# Patient Record
Sex: FEMALE | Race: WHITE | Employment: FULL TIME | URBAN - METROPOLITAN AREA
[De-identification: names, ages, dates, MRNs, and addresses within clinical notes are randomized per-mention and may not be internally consistent; named-entity substitution may affect disease eponyms.]

---

## 2017-04-18 ENCOUNTER — OFFICE VISIT (OUTPATIENT)
Dept: OBGYN CLINIC | Facility: CLINIC | Age: 27
End: 2017-04-18

## 2017-04-18 VITALS
BODY MASS INDEX: 23.54 KG/M2 | DIASTOLIC BLOOD PRESSURE: 70 MMHG | WEIGHT: 150 LBS | HEIGHT: 67 IN | SYSTOLIC BLOOD PRESSURE: 108 MMHG

## 2017-04-18 DIAGNOSIS — Z01.419 ENCOUNTER FOR GYNECOLOGICAL EXAMINATION WITHOUT ABNORMAL FINDING: Primary | ICD-10-CM

## 2017-04-18 PROCEDURE — 99395 PREV VISIT EST AGE 18-39: CPT | Performed by: OBSTETRICS & GYNECOLOGY

## 2017-04-18 PROCEDURE — 87591 N.GONORRHOEAE DNA AMP PROB: CPT | Performed by: OBSTETRICS & GYNECOLOGY

## 2017-04-18 PROCEDURE — 88175 CYTOPATH C/V AUTO FLUID REDO: CPT | Performed by: OBSTETRICS & GYNECOLOGY

## 2017-04-18 PROCEDURE — 87491 CHLMYD TRACH DNA AMP PROBE: CPT | Performed by: OBSTETRICS & GYNECOLOGY

## 2017-04-18 RX ORDER — NORETHINDRONE AND ETHINYL ESTRADIOL 1 MG-35MCG
1 KIT ORAL DAILY
Qty: 3 PACKAGE | Refills: 3 | Status: SHIPPED | OUTPATIENT
Start: 2017-04-18 | End: 2017-04-30

## 2017-04-18 RX ORDER — NORETHINDRONE AND ETHINYL ESTRADIOL 1 MG-35MCG
KIT ORAL
Refills: 0 | COMMUNITY
Start: 2017-04-04 | End: 2017-04-18

## 2017-04-18 NOTE — PROGRESS NOTES
GYN H&P     2017  6:06 PM    CC:Patient presents for annual.    HPI: Patient is a 31year old  Patient's last menstrual period was 2017.  who presents for annual.    Menses: once a month, no heavy bleeding or pain  Pelvic pain: no  Vaginal nodes. No history of splenectomy. PSYCHIATRIC:  No history of depression or anxiety. ENDOCRINOLOGIC:  No reports of sweating, cold or heat intolerance. No polyuria or polydipsia. ALLERGIES:  No history of asthma, hives, eczema or rhinitis.     /70

## 2017-05-01 RX ORDER — NORETHINDRONE AND ETHINYL ESTRADIOL 1 MG-35MCG
KIT ORAL
Qty: 28 TABLET | Status: SHIPPED | OUTPATIENT
Start: 2017-05-01 | End: 2018-01-04

## 2018-04-11 ENCOUNTER — HOSPITAL (OUTPATIENT)
Dept: OTHER | Age: 28
End: 2018-04-11
Attending: FAMILY MEDICINE

## 2018-04-11 ENCOUNTER — IMAGING SERVICES (OUTPATIENT)
Dept: OTHER | Age: 28
End: 2018-04-11

## 2018-05-01 ENCOUNTER — OFFICE VISIT (OUTPATIENT)
Dept: OBGYN CLINIC | Facility: CLINIC | Age: 28
End: 2018-05-01

## 2018-05-01 VITALS
WEIGHT: 150 LBS | SYSTOLIC BLOOD PRESSURE: 100 MMHG | DIASTOLIC BLOOD PRESSURE: 76 MMHG | BODY MASS INDEX: 23.54 KG/M2 | HEIGHT: 67 IN

## 2018-05-01 DIAGNOSIS — R10.2 PELVIC PAIN: ICD-10-CM

## 2018-05-01 DIAGNOSIS — Z01.419 ENCOUNTER FOR GYNECOLOGICAL EXAMINATION WITHOUT ABNORMAL FINDING: Primary | ICD-10-CM

## 2018-05-01 DIAGNOSIS — Z30.41 ENCOUNTER FOR SURVEILLANCE OF CONTRACEPTIVE PILLS: ICD-10-CM

## 2018-05-01 PROBLEM — J98.4 CYSTIC-BULLOUS DISEASE OF LUNG: Status: ACTIVE | Noted: 2018-04-23

## 2018-05-01 PROCEDURE — 99395 PREV VISIT EST AGE 18-39: CPT | Performed by: OBSTETRICS & GYNECOLOGY

## 2018-05-01 RX ORDER — MULTIVITAMIN WITH IRON
250 TABLET ORAL
COMMUNITY
End: 2019-06-10

## 2018-05-01 RX ORDER — DOCUSATE SODIUM 100 MG/1
CAPSULE, LIQUID FILLED ORAL
Refills: 0 | COMMUNITY
Start: 2018-04-26 | End: 2020-06-23

## 2018-05-01 RX ORDER — HYDROCODONE BITARTRATE AND ACETAMINOPHEN 10; 325 MG/1; MG/1
TABLET ORAL
Refills: 0 | COMMUNITY
Start: 2018-04-26 | End: 2020-06-23

## 2018-05-01 NOTE — PROGRESS NOTES
GYN H&P     2018  5:54 PM    CC: Patient is here for annual.    HPI: Patient is a 28year old  for annual. Recently admitted for spontaneous pneumothorax on L lung. She is having some LA pain and feeling lower L abdominal pain.  This started whil 4/2018 x 2 on L side treated with VATS and clipping @ [facility]     Past Surgical History:  No date: COLPOSCOPY, CERVIX W/UPPER ADJACENT VAGINA; W/*  No date: ORAL SURGERY  04/23/2018: OTHER      Comment: lung surgery   04/2018: OTHER      Comment: VATS for palpable hernias  GYNE/:  External Genitalia: Normal appearing, no lesions, normal hair distribution   Urethral meatus appear wnl, no abnormal discharge or lesions noted.    Bladder: well supported, urethra wnl, no palpable tenderness or masses, no discha

## 2018-06-05 ENCOUNTER — PATIENT MESSAGE (OUTPATIENT)
Dept: OBGYN CLINIC | Facility: CLINIC | Age: 28
End: 2018-06-05

## 2018-06-05 RX ORDER — NORETHINDRONE AND ETHINYL ESTRADIOL 1 MG-35MCG
KIT ORAL
Qty: 84 TABLET | Refills: 1 | OUTPATIENT
Start: 2018-06-05

## 2018-06-05 NOTE — TELEPHONE ENCOUNTER
From: Agus Snyder  To: Zohra Kelsey MD  Sent: 6/5/2018 9:26 AM CDT  Subject: Prescription Question    Hi! I believe the birth control prescription was incorrectly sent to [facility] when the renewal was entered.  Because I had just refill

## 2018-06-05 NOTE — TELEPHONE ENCOUNTER
Refill request rec'd for OCP. Pt seen on 5/1/18 for annual and OCP refill sent in by Dr. Gable Apley.

## 2018-06-05 NOTE — TELEPHONE ENCOUNTER
Per pt, her OCP has to be sent to [facility] per her ins co. Resent script to [facility], otherwise, pt can have all other meds sent to [place].

## 2019-05-09 RX ORDER — NORETHINDRONE AND ETHINYL ESTRADIOL 1 MG-35MCG
KIT ORAL
Qty: 84 TABLET | Refills: 0 | Status: SHIPPED | OUTPATIENT
Start: 2019-05-09 | End: 2019-06-10

## 2019-06-10 ENCOUNTER — OFFICE VISIT (OUTPATIENT)
Dept: OBGYN CLINIC | Facility: CLINIC | Age: 29
End: 2019-06-10
Payer: COMMERCIAL

## 2019-06-10 VITALS
BODY MASS INDEX: 23.15 KG/M2 | HEIGHT: 67 IN | DIASTOLIC BLOOD PRESSURE: 60 MMHG | WEIGHT: 147.5 LBS | SYSTOLIC BLOOD PRESSURE: 118 MMHG

## 2019-06-10 DIAGNOSIS — Z30.41 ENCOUNTER FOR SURVEILLANCE OF CONTRACEPTIVE PILLS: ICD-10-CM

## 2019-06-10 DIAGNOSIS — Z01.419 ENCOUNTER FOR GYNECOLOGICAL EXAMINATION WITHOUT ABNORMAL FINDING: Primary | ICD-10-CM

## 2019-06-10 DIAGNOSIS — Z11.3 SCREENING EXAMINATION FOR VENEREAL DISEASE: ICD-10-CM

## 2019-06-10 PROCEDURE — 99395 PREV VISIT EST AGE 18-39: CPT | Performed by: OBSTETRICS & GYNECOLOGY

## 2019-06-10 PROCEDURE — 87591 N.GONORRHOEAE DNA AMP PROB: CPT | Performed by: OBSTETRICS & GYNECOLOGY

## 2019-06-10 PROCEDURE — 87491 CHLMYD TRACH DNA AMP PROBE: CPT | Performed by: OBSTETRICS & GYNECOLOGY

## 2019-06-10 RX ORDER — AMOXICILLIN 250 MG
1 CAPSULE ORAL
COMMUNITY
Start: 2019-05-22 | End: 2020-06-23

## 2019-06-10 NOTE — PROGRESS NOTES
GYN H&P     6/10/2019  5:03 PM    CC: Patient is here for annual. LPS 2017 WNL    HPI: Patient is a 33year old  for above. Patient has had sclerotherapy on both sides due to recurrent pneumothorax.  She would like STI screen          Patient's last file      Years of education: Not on file      Highest education level: Not on file    Occupational History      Occupation:         Comment: [employer]    Tobacco Use      Smoking status: Never Smoker      Smokeless tobacco: Never Used

## 2019-06-12 NOTE — PROGRESS NOTES
Message sent to pt's mychart informing her of negative test results. Pt to call or e-mail the office if she has any questions or concerns.

## 2019-08-13 ENCOUNTER — HOSPITAL (OUTPATIENT)
Dept: OTHER | Age: 29
End: 2019-08-13
Attending: FAMILY MEDICINE

## 2020-06-09 DIAGNOSIS — Z30.41 ENCOUNTER FOR SURVEILLANCE OF CONTRACEPTIVE PILLS: ICD-10-CM

## 2020-06-11 RX ORDER — NORETHINDRONE AND ETHINYL ESTRADIOL 1 MG-35MCG
KIT ORAL
Qty: 84 TABLET | Refills: 0 | Status: SHIPPED | OUTPATIENT
Start: 2020-06-11 | End: 2020-06-23

## 2020-06-23 ENCOUNTER — OFFICE VISIT (OUTPATIENT)
Dept: OBGYN CLINIC | Facility: CLINIC | Age: 30
End: 2020-06-23
Payer: COMMERCIAL

## 2020-06-23 VITALS
BODY MASS INDEX: 26.84 KG/M2 | DIASTOLIC BLOOD PRESSURE: 80 MMHG | SYSTOLIC BLOOD PRESSURE: 120 MMHG | WEIGHT: 171 LBS | HEIGHT: 67 IN

## 2020-06-23 DIAGNOSIS — Z30.41 ENCOUNTER FOR SURVEILLANCE OF CONTRACEPTIVE PILLS: ICD-10-CM

## 2020-06-23 DIAGNOSIS — Z01.419 ENCOUNTER FOR GYNECOLOGICAL EXAMINATION WITHOUT ABNORMAL FINDING: Primary | ICD-10-CM

## 2020-06-23 PROCEDURE — 87624 HPV HI-RISK TYP POOLED RSLT: CPT | Performed by: OBSTETRICS & GYNECOLOGY

## 2020-06-23 PROCEDURE — 99395 PREV VISIT EST AGE 18-39: CPT | Performed by: OBSTETRICS & GYNECOLOGY

## 2020-06-23 RX ORDER — FEXOFENADINE HCL 180 MG/1
180 TABLET ORAL DAILY
COMMUNITY

## 2020-06-23 NOTE — PROGRESS NOTES
GYN H&P     2020  4:54 PM    CC: Patient is here for annual.     HPI: Patient is a 26year old  for annul. No heavy bleeding or pain, No problem with ocp. Sexually active a little. Patient's last menstrual period was 2020.     OB H education level: Not on file    Occupational History      Occupation:         Comment: [employer]    Tobacco Use      Smoking status: Never Smoker      Smokeless tobacco: Never Used    Substance and Sexual Activity      Alcohol use:  Yes MD Giovana

## 2020-06-25 PROBLEM — R87.612 PAPANICOLAOU SMEAR OF CERVIX WITH LOW GRADE SQUAMOUS INTRAEPITHELIAL LESION (LGSIL): Status: ACTIVE | Noted: 2020-06-25

## 2020-06-26 NOTE — PROGRESS NOTES
Called pt and provided Dr. Guzman Gear instructions. Pt scheduled colpo for July 21 at 1600. Pt verbalized understanding.

## 2020-07-21 ENCOUNTER — OFFICE VISIT (OUTPATIENT)
Dept: OBGYN CLINIC | Facility: CLINIC | Age: 30
End: 2020-07-21
Payer: COMMERCIAL

## 2020-07-21 VITALS — DIASTOLIC BLOOD PRESSURE: 60 MMHG | HEIGHT: 67 IN | BODY MASS INDEX: 27 KG/M2 | SYSTOLIC BLOOD PRESSURE: 100 MMHG

## 2020-07-21 DIAGNOSIS — R87.612 PAPANICOLAOU SMEAR OF CERVIX WITH LOW GRADE SQUAMOUS INTRAEPITHELIAL LESION (LGSIL): Primary | ICD-10-CM

## 2020-07-21 LAB
CONTROL LINE PRESENT WITH A CLEAR BACKGROUND (YES/NO): YES YES/NO
PREGNANCY TEST, URINE: NEGATIVE

## 2020-07-21 PROCEDURE — 88305 TISSUE EXAM BY PATHOLOGIST: CPT | Performed by: OBSTETRICS & GYNECOLOGY

## 2020-07-21 PROCEDURE — 81025 URINE PREGNANCY TEST: CPT | Performed by: OBSTETRICS & GYNECOLOGY

## 2020-07-21 PROCEDURE — 3074F SYST BP LT 130 MM HG: CPT | Performed by: OBSTETRICS & GYNECOLOGY

## 2020-07-21 PROCEDURE — 3078F DIAST BP <80 MM HG: CPT | Performed by: OBSTETRICS & GYNECOLOGY

## 2020-07-21 PROCEDURE — 57454 BX/CURETT OF CERVIX W/SCOPE: CPT | Performed by: OBSTETRICS & GYNECOLOGY

## 2021-07-15 DIAGNOSIS — Z30.41 ENCOUNTER FOR SURVEILLANCE OF CONTRACEPTIVE PILLS: ICD-10-CM

## 2021-07-19 RX ORDER — NORETHINDRONE AND ETHINYL ESTRADIOL 1 MG-35MCG
1 KIT ORAL DAILY
Qty: 28 TABLET | Refills: 0 | Status: SHIPPED | OUTPATIENT
Start: 2021-07-19 | End: 2021-08-19

## 2021-08-19 ENCOUNTER — OFFICE VISIT (OUTPATIENT)
Dept: OBGYN CLINIC | Facility: CLINIC | Age: 31
End: 2021-08-19
Payer: COMMERCIAL

## 2021-08-19 VITALS
BODY MASS INDEX: 26.37 KG/M2 | HEIGHT: 67 IN | SYSTOLIC BLOOD PRESSURE: 130 MMHG | DIASTOLIC BLOOD PRESSURE: 92 MMHG | WEIGHT: 168 LBS

## 2021-08-19 DIAGNOSIS — R87.612 PAPANICOLAOU SMEAR OF CERVIX WITH LOW GRADE SQUAMOUS INTRAEPITHELIAL LESION (LGSIL): ICD-10-CM

## 2021-08-19 DIAGNOSIS — Z30.41 ENCOUNTER FOR SURVEILLANCE OF CONTRACEPTIVE PILLS: ICD-10-CM

## 2021-08-19 DIAGNOSIS — Z01.419 ENCOUNTER FOR GYNECOLOGICAL EXAMINATION WITHOUT ABNORMAL FINDING: Primary | ICD-10-CM

## 2021-08-19 PROCEDURE — 3008F BODY MASS INDEX DOCD: CPT | Performed by: OBSTETRICS & GYNECOLOGY

## 2021-08-19 PROCEDURE — 3075F SYST BP GE 130 - 139MM HG: CPT | Performed by: OBSTETRICS & GYNECOLOGY

## 2021-08-19 PROCEDURE — 87624 HPV HI-RISK TYP POOLED RSLT: CPT | Performed by: OBSTETRICS & GYNECOLOGY

## 2021-08-19 PROCEDURE — 3080F DIAST BP >= 90 MM HG: CPT | Performed by: OBSTETRICS & GYNECOLOGY

## 2021-08-19 PROCEDURE — 99395 PREV VISIT EST AGE 18-39: CPT | Performed by: OBSTETRICS & GYNECOLOGY

## 2021-08-19 RX ORDER — NORETHINDRONE AND ETHINYL ESTRADIOL 1 MG-35MCG
1 KIT ORAL DAILY
Qty: 84 TABLET | Refills: 3 | Status: SHIPPED | OUTPATIENT
Start: 2021-08-19

## 2021-08-19 NOTE — PROGRESS NOTES
GYN H&P     2021  4:53 PM    CC: Patient is here for annual.     HPI: Patient is a 31year old  for annual. LPS LGSIL s/p colpo. On OCPs    Needs OCP's refilled      Patient's last menstrual period was 2021.     OB History    Para T Socioeconomic History      Marital status: Single      Spouse name: Not on file      Number of children: Not on file      Years of education: Not on file      Highest education level: Not on file    Occupational History      Occupation:         C pills  - Norethindrone-Eth Estradiol (PIRMELLA 1/35) 1-35 MG-MCG Oral Tab; Take 1 tablet by mouth daily. Dispense: 84 tablet; Refill: 3    3.  Encounter for gynecological examination without abnormal finding      Sedrick Saleh MD

## 2021-08-20 LAB — HPV I/H RISK 1 DNA SPEC QL NAA+PROBE: POSITIVE

## 2021-08-25 NOTE — PROGRESS NOTES
Left  to review test results via [employer] and to call office tomorrow to schedule f/u appmnt

## 2021-09-16 ENCOUNTER — OFFICE VISIT (OUTPATIENT)
Dept: OBGYN CLINIC | Facility: CLINIC | Age: 31
End: 2021-09-16
Payer: COMMERCIAL

## 2021-09-16 VITALS — HEIGHT: 67 IN | BODY MASS INDEX: 26 KG/M2 | SYSTOLIC BLOOD PRESSURE: 98 MMHG | DIASTOLIC BLOOD PRESSURE: 58 MMHG

## 2021-09-16 DIAGNOSIS — R87.612 PAPANICOLAOU SMEAR OF CERVIX WITH LOW GRADE SQUAMOUS INTRAEPITHELIAL LESION (LGSIL): Primary | ICD-10-CM

## 2021-09-16 PROCEDURE — 3078F DIAST BP <80 MM HG: CPT | Performed by: OBSTETRICS & GYNECOLOGY

## 2021-09-16 PROCEDURE — 57454 BX/CURETT OF CERVIX W/SCOPE: CPT | Performed by: OBSTETRICS & GYNECOLOGY

## 2021-09-16 PROCEDURE — 3074F SYST BP LT 130 MM HG: CPT | Performed by: OBSTETRICS & GYNECOLOGY

## 2021-09-16 PROCEDURE — 88305 TISSUE EXAM BY PATHOLOGIST: CPT | Performed by: OBSTETRICS & GYNECOLOGY

## 2021-09-16 NOTE — PROGRESS NOTES
Her brother recently received his white coat in med school  Colposcopy    Pap = LGSIL    Patient was positioned in stir-ups. She was consented for colposcopy and possible biopsies.  I discussed with her to expect some cramping and light vaginal bleeding aft

## 2022-08-02 ENCOUNTER — OFFICE VISIT (OUTPATIENT)
Dept: OBGYN CLINIC | Facility: CLINIC | Age: 32
End: 2022-08-02
Payer: COMMERCIAL

## 2022-08-02 VITALS
WEIGHT: 175 LBS | DIASTOLIC BLOOD PRESSURE: 76 MMHG | BODY MASS INDEX: 27.47 KG/M2 | SYSTOLIC BLOOD PRESSURE: 118 MMHG | HEIGHT: 67 IN

## 2022-08-02 DIAGNOSIS — Z30.41 ENCOUNTER FOR SURVEILLANCE OF CONTRACEPTIVE PILLS: ICD-10-CM

## 2022-08-02 DIAGNOSIS — Z01.419 ENCOUNTER FOR GYNECOLOGICAL EXAMINATION WITHOUT ABNORMAL FINDING: Primary | ICD-10-CM

## 2022-08-02 DIAGNOSIS — R87.612 PAPANICOLAOU SMEAR OF CERVIX WITH LOW GRADE SQUAMOUS INTRAEPITHELIAL LESION (LGSIL): ICD-10-CM

## 2022-08-02 PROCEDURE — 3078F DIAST BP <80 MM HG: CPT | Performed by: OBSTETRICS & GYNECOLOGY

## 2022-08-02 PROCEDURE — 3074F SYST BP LT 130 MM HG: CPT | Performed by: OBSTETRICS & GYNECOLOGY

## 2022-08-02 PROCEDURE — 3008F BODY MASS INDEX DOCD: CPT | Performed by: OBSTETRICS & GYNECOLOGY

## 2022-08-02 PROCEDURE — 99395 PREV VISIT EST AGE 18-39: CPT | Performed by: OBSTETRICS & GYNECOLOGY

## 2022-08-02 PROCEDURE — 87624 HPV HI-RISK TYP POOLED RSLT: CPT | Performed by: OBSTETRICS & GYNECOLOGY

## 2022-08-02 RX ORDER — NORETHINDRONE AND ETHINYL ESTRADIOL 1 MG-35MCG
1 KIT ORAL DAILY
Qty: 84 TABLET | Refills: 3 | Status: SHIPPED | OUTPATIENT
Start: 2022-08-02

## 2022-08-03 LAB — HPV I/H RISK 1 DNA SPEC QL NAA+PROBE: POSITIVE

## 2022-08-16 ENCOUNTER — TELEPHONE (OUTPATIENT)
Dept: OBGYN CLINIC | Facility: CLINIC | Age: 32
End: 2022-08-16

## 2022-08-16 NOTE — TELEPHONE ENCOUNTER
Called and dw pt pap showed LGSIL. DW her option of repeat colpo v LEEP. She would like LEEP. I dw her the procedure and SE. I told her my nurse would call her to schedule office LEEP.

## 2022-08-16 NOTE — TELEPHONE ENCOUNTER
Called pt and scheduled LEEP 09/26/2022 with LC. Pt agrees. ----- Message from Nini Santiago MD sent at 8/16/2022  3:39 PM CDT -----  Regarding: schedule office LEEP  Please call this patient to schedule office LEEP. I have already spoken to her and sent her some written information.      Rickie Freeman

## 2022-09-26 ENCOUNTER — OFFICE VISIT (OUTPATIENT)
Dept: OBGYN CLINIC | Facility: CLINIC | Age: 32
End: 2022-09-26
Payer: COMMERCIAL

## 2022-09-26 DIAGNOSIS — R87.612 PAPANICOLAOU SMEAR OF CERVIX WITH LOW GRADE SQUAMOUS INTRAEPITHELIAL LESION (LGSIL): Primary | ICD-10-CM

## 2022-11-11 ENCOUNTER — OFFICE VISIT (OUTPATIENT)
Dept: OBGYN CLINIC | Facility: CLINIC | Age: 32
End: 2022-11-11
Payer: COMMERCIAL

## 2022-11-11 VITALS
BODY MASS INDEX: 27.97 KG/M2 | DIASTOLIC BLOOD PRESSURE: 70 MMHG | WEIGHT: 178.19 LBS | SYSTOLIC BLOOD PRESSURE: 110 MMHG | HEIGHT: 67 IN

## 2022-11-11 DIAGNOSIS — N87.0 DYSPLASIA OF CERVIX, LOW GRADE (CIN 1): Primary | ICD-10-CM

## 2022-11-11 LAB
CONTROL LINE PRESENT WITH A CLEAR BACKGROUND (YES/NO): YES YES/NO
PREGNANCY TEST, URINE: NEGATIVE

## 2022-11-11 PROCEDURE — 88307 TISSUE EXAM BY PATHOLOGIST: CPT | Performed by: OBSTETRICS & GYNECOLOGY

## 2022-11-11 NOTE — PROGRESS NOTES
Pap = LGSIL (persistent)  Colpo bx = sondra 1    Here for LEEP. I dw pt the pathology results, how the LEEP is performed, and to expect some light vaginal bleeding and lower abdominal cramping. I discussed the risks of the procedure including bleeding, infection, damage to internal organs, possible  subsequent delivery. She was instructed to avoid heavy activity for 24 hours and to avoid vaginal sexual intercourse, tampons or douching for 2 weeks. She was also instructed to call if worsening lower abdominal pain, heavy vaginal bleeding, or a foul vaginal discharge. Lugol's solution was applied to visualize the transformation zone. Cervix was injected with 10 CC 1% lidocaine with epinephrine. LEEP was performed. Hemostasis was obtained with cautery and Monsel's solution. I dw pt to FU in 2 weeks to evaluate LEEP base.

## 2022-11-21 ENCOUNTER — OFFICE VISIT (OUTPATIENT)
Dept: OBGYN CLINIC | Facility: CLINIC | Age: 32
End: 2022-11-21
Payer: COMMERCIAL

## 2022-11-21 VITALS — BODY MASS INDEX: 28 KG/M2 | HEIGHT: 67 IN | SYSTOLIC BLOOD PRESSURE: 106 MMHG | DIASTOLIC BLOOD PRESSURE: 68 MMHG

## 2022-11-21 DIAGNOSIS — Z09 POSTOP CHECK: Primary | ICD-10-CM

## 2022-11-21 PROCEDURE — 99024 POSTOP FOLLOW-UP VISIT: CPT | Performed by: OBSTETRICS & GYNECOLOGY

## 2022-11-21 PROCEDURE — 3074F SYST BP LT 130 MM HG: CPT | Performed by: OBSTETRICS & GYNECOLOGY

## 2022-11-21 PROCEDURE — 3078F DIAST BP <80 MM HG: CPT | Performed by: OBSTETRICS & GYNECOLOGY

## 2023-02-21 ENCOUNTER — TELEPHONE (OUTPATIENT)
Dept: OBGYN CLINIC | Facility: CLINIC | Age: 33
End: 2023-02-21

## 2023-02-21 NOTE — TELEPHONE ENCOUNTER
Pt is looking for a refill for the following medication.         Norethindrone-Eth Estradiol [facility] THE 1/35) 1-35 MG-MCG Oral Tab    [facility] [phone], [phone]

## 2023-02-24 ENCOUNTER — TELEPHONE (OUTPATIENT)
Dept: OBGYN CLINIC | Facility: CLINIC | Age: 33
End: 2023-02-24

## 2023-02-24 DIAGNOSIS — Z30.41 ENCOUNTER FOR SURVEILLANCE OF CONTRACEPTIVE PILLS: ICD-10-CM

## 2023-02-24 RX ORDER — NORETHINDRONE AND ETHINYL ESTRADIOL 1 MG-35MCG
1 KIT ORAL DAILY
Qty: 84 TABLET | Refills: 1 | Status: SHIPPED | OUTPATIENT
Start: 2023-02-24

## 2023-02-24 NOTE — TELEPHONE ENCOUNTER
The patient called and stated that [facility] was trying to reach the office to get an alternative for her birth control.      Norethindrone-Eth Estradiol (PIRMELLA 1/35) 1-35 MG-MCG Oral Tab

## 2023-03-02 ENCOUNTER — TELEPHONE (OUTPATIENT)
Dept: OBGYN CLINIC | Facility: CLINIC | Age: 33
End: 2023-03-02

## 2023-03-02 DIAGNOSIS — Z78.9 USES BIRTH CONTROL: Primary | ICD-10-CM

## 2023-03-02 RX ORDER — NORETHINDRONE AND ETHINYL ESTRADIOL 1 MG-35MCG
1 KIT ORAL DAILY
Qty: 84 TABLET | Refills: 3 | Status: SHIPPED | OUTPATIENT
Start: 2023-03-02

## 2023-03-02 NOTE — TELEPHONE ENCOUNTER
Pharmacy called in stating the following medication is discontinued and they would like to know what alternative medication LC would like to prescribe to patient.     Norethindrone-Eth Estradiol (PIRMELLA 1/35) 1-35 MG-MCG Oral Tab

## 2023-03-02 NOTE — TELEPHONE ENCOUNTER
RN called pharmacy to request alternatives. Pharmacist provided RN with alternative and new Rx sent (verified with pharmacist).

## 2023-06-26 ENCOUNTER — TELEPHONE (OUTPATIENT)
Dept: OBGYN CLINIC | Facility: CLINIC | Age: 33
End: 2023-06-26

## 2023-06-26 ENCOUNTER — TELEPHONE (OUTPATIENT)
Facility: CLINIC | Age: 33
End: 2023-06-26

## 2023-06-26 DIAGNOSIS — Z78.9 USES BIRTH CONTROL: ICD-10-CM

## 2023-06-26 RX ORDER — NORETHINDRONE AND ETHINYL ESTRADIOL 1 MG-35MCG
1 KIT ORAL DAILY
Qty: 84 TABLET | Refills: 2 | Status: SHIPPED | OUTPATIENT
Start: 2023-06-26

## 2023-06-26 NOTE — TELEPHONE ENCOUNTER
Pt is calling requesting a refill on medication:Norethindrone-Eth Estradiol (Julio Wellington 1/35, 28,) 1-35 MG-MCG Oral Tab. To be sent over to Pharmacy:[facility], [address], [phone] . Please assist.

## 2023-09-21 ENCOUNTER — OFFICE VISIT (OUTPATIENT)
Dept: OBGYN CLINIC | Facility: CLINIC | Age: 33
End: 2023-09-21
Payer: COMMERCIAL

## 2023-09-21 VITALS
BODY MASS INDEX: 27.42 KG/M2 | WEIGHT: 174.69 LBS | HEIGHT: 67 IN | SYSTOLIC BLOOD PRESSURE: 122 MMHG | DIASTOLIC BLOOD PRESSURE: 70 MMHG

## 2023-09-21 DIAGNOSIS — Z78.9 USES BIRTH CONTROL: ICD-10-CM

## 2023-09-21 DIAGNOSIS — Z12.4 SCREENING FOR CERVICAL CANCER: ICD-10-CM

## 2023-09-21 DIAGNOSIS — N87.0 DYSPLASIA OF CERVIX, LOW GRADE (CIN 1): ICD-10-CM

## 2023-09-21 DIAGNOSIS — Z01.419 ENCOUNTER FOR GYNECOLOGICAL EXAMINATION WITHOUT ABNORMAL FINDING: Primary | ICD-10-CM

## 2023-09-21 PROCEDURE — 3074F SYST BP LT 130 MM HG: CPT | Performed by: OBSTETRICS & GYNECOLOGY

## 2023-09-21 PROCEDURE — 87624 HPV HI-RISK TYP POOLED RSLT: CPT | Performed by: OBSTETRICS & GYNECOLOGY

## 2023-09-21 PROCEDURE — 88175 CYTOPATH C/V AUTO FLUID REDO: CPT | Performed by: OBSTETRICS & GYNECOLOGY

## 2023-09-21 PROCEDURE — 3078F DIAST BP <80 MM HG: CPT | Performed by: OBSTETRICS & GYNECOLOGY

## 2023-09-21 PROCEDURE — 99395 PREV VISIT EST AGE 18-39: CPT | Performed by: OBSTETRICS & GYNECOLOGY

## 2023-09-21 PROCEDURE — 3008F BODY MASS INDEX DOCD: CPT | Performed by: OBSTETRICS & GYNECOLOGY

## 2023-09-21 RX ORDER — NORETHINDRONE AND ETHINYL ESTRADIOL 1 MG-35MCG
1 KIT ORAL DAILY
Qty: 84 TABLET | Refills: 2 | Status: SHIPPED | OUTPATIENT
Start: 2023-09-21

## 2023-10-12 ENCOUNTER — TELEPHONE (OUTPATIENT)
Dept: OBGYN CLINIC | Facility: CLINIC | Age: 33
End: 2023-10-12

## 2023-10-12 NOTE — TELEPHONE ENCOUNTER
Patient called stating she fainted at home and lost her bladder and think she had a seizure. Per patient is concern and stated she doesn't have a PCP. pt also stated she do not want to go to the er but would like medical guidance. Please assist.

## 2023-10-12 NOTE — TELEPHONE ENCOUNTER
RN spoke to pt. Pt states that she believes she had a seizure 10/10. Denies seeking care, refusing to go to ED. Pt schedule with MP tomorrow to est. Care. Pt agrees to [facility].

## 2023-10-13 ENCOUNTER — LAB ENCOUNTER (OUTPATIENT)
Dept: LAB | Age: 33
End: 2023-10-13
Attending: FAMILY MEDICINE
Payer: COMMERCIAL

## 2023-10-13 ENCOUNTER — OFFICE VISIT (OUTPATIENT)
Facility: CLINIC | Age: 33
End: 2023-10-13
Payer: COMMERCIAL

## 2023-10-13 ENCOUNTER — LAB ENCOUNTER (OUTPATIENT)
Dept: LAB | Facility: REFERENCE LAB | Age: 33
End: 2023-10-13
Attending: FAMILY MEDICINE
Payer: COMMERCIAL

## 2023-10-13 VITALS
SYSTOLIC BLOOD PRESSURE: 110 MMHG | BODY MASS INDEX: 26.68 KG/M2 | OXYGEN SATURATION: 98 % | HEART RATE: 78 BPM | WEIGHT: 170 LBS | DIASTOLIC BLOOD PRESSURE: 62 MMHG | HEIGHT: 67 IN

## 2023-10-13 DIAGNOSIS — R40.20 LOSS OF CONSCIOUSNESS (HCC): Primary | ICD-10-CM

## 2023-10-13 LAB
ALBUMIN SERPL-MCNC: 3.7 G/DL (ref 3.4–5)
ALBUMIN/GLOB SERPL: 1 {RATIO} (ref 1–2)
ALP LIVER SERPL-CCNC: 44 U/L
ALT SERPL-CCNC: 33 U/L
ANION GAP SERPL CALC-SCNC: 7 MMOL/L (ref 0–18)
AST SERPL-CCNC: 23 U/L (ref 15–37)
ATRIAL RATE: 71 BPM
BASOPHILS # BLD AUTO: 0.05 X10(3) UL (ref 0–0.2)
BASOPHILS NFR BLD AUTO: 0.8 %
BILIRUB SERPL-MCNC: 0.4 MG/DL (ref 0.1–2)
BUN BLD-MCNC: 12 MG/DL (ref 7–18)
BUN/CREAT SERPL: 14.3 (ref 10–20)
CALCIUM BLD-MCNC: 9.4 MG/DL (ref 8.5–10.1)
CHLORIDE SERPL-SCNC: 107 MMOL/L (ref 98–112)
CHOLEST SERPL-MCNC: 191 MG/DL (ref ?–200)
CO2 SERPL-SCNC: 26 MMOL/L (ref 21–32)
CREAT BLD-MCNC: 0.84 MG/DL
DEPRECATED RDW RBC AUTO: 39.8 FL (ref 35.1–46.3)
EGFRCR SERPLBLD CKD-EPI 2021: 93 ML/MIN/1.73M2 (ref 60–?)
EOSINOPHIL # BLD AUTO: 0.24 X10(3) UL (ref 0–0.7)
EOSINOPHIL NFR BLD AUTO: 3.9 %
ERYTHROCYTE [DISTWIDTH] IN BLOOD BY AUTOMATED COUNT: 11.8 % (ref 11–15)
FASTING PATIENT LIPID ANSWER: NO
FASTING STATUS PATIENT QL REPORTED: NO
GLOBULIN PLAS-MCNC: 3.8 G/DL (ref 2.8–4.4)
GLUCOSE BLD-MCNC: 92 MG/DL (ref 70–99)
HCT VFR BLD AUTO: 43.9 %
HDLC SERPL-MCNC: 53 MG/DL (ref 40–59)
HGB BLD-MCNC: 14.5 G/DL
IMM GRANULOCYTES # BLD AUTO: 0.01 X10(3) UL (ref 0–1)
IMM GRANULOCYTES NFR BLD: 0.2 %
LDLC SERPL CALC-MCNC: 102 MG/DL (ref ?–100)
LYMPHOCYTES # BLD AUTO: 2.3 X10(3) UL (ref 1–4)
LYMPHOCYTES NFR BLD AUTO: 37.8 %
MCH RBC QN AUTO: 30.4 PG (ref 26–34)
MCHC RBC AUTO-ENTMCNC: 33 G/DL (ref 31–37)
MCV RBC AUTO: 92 FL
MONOCYTES # BLD AUTO: 0.47 X10(3) UL (ref 0.1–1)
MONOCYTES NFR BLD AUTO: 7.7 %
NEUTROPHILS # BLD AUTO: 3.01 X10 (3) UL (ref 1.5–7.7)
NEUTROPHILS # BLD AUTO: 3.01 X10(3) UL (ref 1.5–7.7)
NEUTROPHILS NFR BLD AUTO: 49.6 %
NONHDLC SERPL-MCNC: 138 MG/DL (ref ?–130)
OSMOLALITY SERPL CALC.SUM OF ELEC: 289 MOSM/KG (ref 275–295)
P AXIS: 46 DEGREES
P-R INTERVAL: 122 MS
PLATELET # BLD AUTO: 276 10(3)UL (ref 150–450)
POTASSIUM SERPL-SCNC: 4.6 MMOL/L (ref 3.5–5.1)
PROT SERPL-MCNC: 7.5 G/DL (ref 6.4–8.2)
Q-T INTERVAL: 390 MS
QRS DURATION: 84 MS
QTC CALCULATION (BEZET): 423 MS
R AXIS: 5 DEGREES
RBC # BLD AUTO: 4.77 X10(6)UL
SODIUM SERPL-SCNC: 140 MMOL/L (ref 136–145)
T AXIS: 16 DEGREES
TRIGL SERPL-MCNC: 209 MG/DL (ref 30–149)
TSI SER-ACNC: 0.91 MIU/ML (ref 0.36–3.74)
VENTRICULAR RATE: 71 BPM
VLDLC SERPL CALC-MCNC: 35 MG/DL (ref 0–30)
WBC # BLD AUTO: 6.1 X10(3) UL (ref 4–11)

## 2023-10-13 PROCEDURE — 36415 COLL VENOUS BLD VENIPUNCTURE: CPT | Performed by: FAMILY MEDICINE

## 2023-10-13 PROCEDURE — 99204 OFFICE O/P NEW MOD 45 MIN: CPT | Performed by: FAMILY MEDICINE

## 2023-10-13 PROCEDURE — 83036 HEMOGLOBIN GLYCOSYLATED A1C: CPT | Performed by: FAMILY MEDICINE

## 2023-10-13 PROCEDURE — 3078F DIAST BP <80 MM HG: CPT | Performed by: FAMILY MEDICINE

## 2023-10-13 PROCEDURE — 93005 ELECTROCARDIOGRAM TRACING: CPT

## 2023-10-13 PROCEDURE — 93010 ELECTROCARDIOGRAM REPORT: CPT | Performed by: STUDENT IN AN ORGANIZED HEALTH CARE EDUCATION/TRAINING PROGRAM

## 2023-10-13 PROCEDURE — 84443 ASSAY THYROID STIM HORMONE: CPT | Performed by: FAMILY MEDICINE

## 2023-10-13 PROCEDURE — 3008F BODY MASS INDEX DOCD: CPT | Performed by: FAMILY MEDICINE

## 2023-10-13 PROCEDURE — 80061 LIPID PANEL: CPT | Performed by: FAMILY MEDICINE

## 2023-10-13 PROCEDURE — 85025 COMPLETE CBC W/AUTO DIFF WBC: CPT | Performed by: FAMILY MEDICINE

## 2023-10-13 PROCEDURE — 3074F SYST BP LT 130 MM HG: CPT | Performed by: FAMILY MEDICINE

## 2023-10-13 PROCEDURE — 80053 COMPREHEN METABOLIC PANEL: CPT | Performed by: FAMILY MEDICINE

## 2023-10-18 LAB
EST. AVERAGE GLUCOSE BLD GHB EST-MCNC: 108 MG/DL (ref 68–126)
HBA1C MFR BLD: 5.4 % (ref ?–5.7)

## 2023-10-23 ENCOUNTER — TELEPHONE (OUTPATIENT)
Dept: NEUROLOGY | Facility: CLINIC | Age: 33
End: 2023-10-23

## 2023-10-23 NOTE — TELEPHONE ENCOUNTER
----- Message from Tj Prince DO sent at 10/20/2023  5:29 PM CDT -----  Regarding: Can we schedule this with one of my colleagues? Hi,  Can this patient get scheduled with one of my colleagues so they can get seen quickly? If not we will have to add her on sometime in November. ----- Message -----  From: Bal Smith DO  Sent: 10/13/2023  12:04 PM CDT  To: DO More Knutson,    I just saw this patient today with a concerning episode (possible seizure, no prior hx). She's new to me as of today. Normal exam. I checked baseline labs and EKG, but of course would love for her to see you next week. Any chance you could add her on to your schedule? No worries if not. Just let me know.      Saskia Mercer

## 2024-06-09 DIAGNOSIS — Z30.41 ENCOUNTER FOR SURVEILLANCE OF CONTRACEPTIVE PILLS: ICD-10-CM

## 2024-06-10 RX ORDER — NORETHINDRONE AND ETHINYL ESTRADIOL 1 MG-35MCG
1 KIT ORAL DAILY
Qty: 84 TABLET | Refills: 0 | Status: SHIPPED | OUTPATIENT
Start: 2024-06-10 | End: 2024-09-05

## 2024-09-05 DIAGNOSIS — Z30.41 ENCOUNTER FOR SURVEILLANCE OF CONTRACEPTIVE PILLS: ICD-10-CM

## 2024-09-05 RX ORDER — NORETHINDRONE AND ETHINYL ESTRADIOL 1 MG-35MCG
1 KIT ORAL DAILY
Qty: 84 TABLET | Refills: 0 | Status: SHIPPED | OUTPATIENT
Start: 2024-09-05 | End: 2024-10-02

## 2024-10-02 ENCOUNTER — OFFICE VISIT (OUTPATIENT)
Dept: OBGYN CLINIC | Facility: CLINIC | Age: 34
End: 2024-10-02
Payer: COMMERCIAL

## 2024-10-02 VITALS
SYSTOLIC BLOOD PRESSURE: 130 MMHG | WEIGHT: 174.88 LBS | BODY MASS INDEX: 27.45 KG/M2 | HEIGHT: 67 IN | DIASTOLIC BLOOD PRESSURE: 76 MMHG

## 2024-10-02 DIAGNOSIS — Z01.419 ENCOUNTER FOR GYNECOLOGICAL EXAMINATION WITHOUT ABNORMAL FINDING: ICD-10-CM

## 2024-10-02 DIAGNOSIS — Z30.41 ENCOUNTER FOR SURVEILLANCE OF CONTRACEPTIVE PILLS: Primary | ICD-10-CM

## 2024-10-02 PROCEDURE — 90656 IIV3 VACC NO PRSV 0.5 ML IM: CPT | Performed by: OBSTETRICS & GYNECOLOGY

## 2024-10-02 PROCEDURE — 87624 HPV HI-RISK TYP POOLED RSLT: CPT | Performed by: OBSTETRICS & GYNECOLOGY

## 2024-10-02 PROCEDURE — 99395 PREV VISIT EST AGE 18-39: CPT | Performed by: OBSTETRICS & GYNECOLOGY

## 2024-10-02 PROCEDURE — 90471 IMMUNIZATION ADMIN: CPT | Performed by: OBSTETRICS & GYNECOLOGY

## 2024-10-02 RX ORDER — NORETHINDRONE AND ETHINYL ESTRADIOL 1 MG-35MCG
1 KIT ORAL DAILY
Qty: 84 TABLET | Refills: 3 | Status: SHIPPED | OUTPATIENT
Start: 2024-10-02

## 2024-10-02 NOTE — PROGRESS NOTES
GYN H&P     10/2/2024  4:46 PM    CC: Patient is here for annual.     HPI: Patient is a 34 year old  for annual      Depression Screening (PHQ-2/PHQ-9): Over the LAST 2 WEEKS   Little interest or pleasure in doing things (over the last two weeks)?: Not at all    Feeling down, depressed, or hopeless (over the last two weeks)?: Not at all    PHQ-2 SCORE: 0            Patient's last menstrual period was 2024.    OB History    Para Term  AB Living   0 0 0 0 0 0   SAB IAB Ectopic Multiple Live Births   0 0 0 0         GYN hx:    Hx Prior Abnormal Pap: Yes  Pap Date: 23  Pap Result Notes: wnl      Past Medical History:    Human papilloma virus infection    Low grade squamous intraepith lesion on cytologic smear cervix (lgsil)    Pancreatitis (HCC)    Pneumothorax    4/2018 x 2 on L side treated with VATS and clipping @ [facility]     Past Surgical History:   Procedure Laterality Date    Colposcopy, cervix w/upper adjacent vagina; w/biopsy(s), cervix      Leep  2022    sondra 1    Oral surgery      Other  2018    lung surgery     Other  2018    VATS for L  pneumothorax @ [facility]    Other surgical history  2019    BROCHOSCOPY AND VIDEO ASSISTED THORACOSCOPY    Other surgical history  2019    BROCHOSCOPY AND VIDEO ASSISTED THORACOSCOPY    Other surgical history  2018    BROCHOSCOPY AND VIDEO ASSISTED THORACOSCOPY     Allergies   Allergen Reactions    Bacitracin HIVES    Neomycin HIVES    Polymyxin B HIVES    Zithromax [Azithromycin] UNKNOWN     Increases symptoms of strep    Benzalkonium Chloride RASH     Family History   Problem Relation Age of Onset    Kidney Disease Father     Other (kidney cancer) Father     Arthritis Mother         hip replacement age 59    No Known Problems Brother     Colon Cancer Maternal Grandfather 60        lived until age 80    Diabetes Maternal Grandfather     Heart Disease Maternal Grandfather     Lung Disorder Paternal Grandfather         cancer     Heart Disease Paternal Grandmother     Asthma Paternal Grandmother     No Known Problems Brother     Ovarian Cancer Neg     DVT/VTE Neg      Social History     Socioeconomic History    Marital status: Single   Occupational History    Occupation:      Comment: [employer]   Tobacco Use    Smoking status: Never    Smokeless tobacco: Never   Vaping Use    Vaping status: Never Used   Substance and Sexual Activity    Alcohol use: Yes    Drug use: No    Sexual activity: Yes     Partners: Male     Birth control/protection: Pill     Social History     Social History Narrative    Live with dog    No h/o abuse       Medications reviewed. See active list.     /76   Ht 67\"   Wt 174 lb 14.4 oz (79.3 kg)   LMP 09/08/2024   BMI 27.39 kg/m²       Exam:   GENERAL: well developed, well nourished, in no apparent distress  SKIN: no rashes, no suspicious lesions  HEENT: normal  NECK: supple; no thyroidmegaly, no adenopathy  BREASTS: symmetrical, nontender, no palpable masses or nodes, no nipple discharge, no skin changes, no dippling, no palpable axillary adenopathy  ABDOMEN: Soft, non distended; non tender, no masses.  Liver and spleen non-tender, no enlargement. No palpable hernias  GYNE/:  External Genitalia: Normal appearing, no lesions, normal hair distribution   Urethral meatus appear wnl, no abnormal discharge or lesions noted.   Bladder: well supported, urethra wnl, no palpable tenderness or masses, no discharge  Vagina: normal pink mucosa, no lesions, normal clear discharge.   Uterus: midline, mobile, non-tender, firm and smooth  Cervix: pink, no lesions grossly visible, no discharge  Adnexa: non tender, no palpable masses, normal size  Anus:  No lesions or visible hemorrhoids        A/P: Patient is 34 year old female     1. Encounter for surveillance of contraceptive pills    2. Encounter for gynecological examination without abnormal finding  - ThinPrep PAP Smear; Future  - Hpv Dna  High Risk  , Thin Prep Collect; Future  - ThinPrep PAP Smear  - Hpv Dna  High Risk , Thin Prep Collect      Natasha Akhtar MD

## 2024-10-02 NOTE — H&P
[facility] - OB/GYN    History and Physical    Britany Amin Patient Status:  No patient class for patient encounter    1990 MRN [id number]   Location [facility] - OB/GYN Attending No att. providers found   Hosp Day # 0 PCP None Pcp     Date:  10/2/2024  Date of Admission:  (Not on file)    History provided by:{Persons; family members:[id number]}  HPI:     Chief Complaint   Patient presents with    Annual     Physical      HPI    History     Past Medical History:    Human papilloma virus infection    Low grade squamous intraepith lesion on cytologic smear cervix (lgsil)    Pancreatitis (HCC)    Pneumothorax    4/2018 x 2 on L side treated with VATS and clipping @ [facility]     Past Surgical History:   Procedure Laterality Date    Colposcopy, cervix w/upper adjacent vagina; w/biopsy(s), cervix      Leep  2022    sondra 1    Oral surgery      Other  2018    lung surgery     Other  2018    VATS for L  pneumothorax @ [facility]    Other surgical history  2019    BROCHOSCOPY AND VIDEO ASSISTED THORACOSCOPY    Other surgical history  2019    BROCHOSCOPY AND VIDEO ASSISTED THORACOSCOPY    Other surgical history  2018    BROCHOSCOPY AND VIDEO ASSISTED THORACOSCOPY     Family History   Problem Relation Age of Onset    Kidney Disease Father     Other (kidney cancer) Father     Arthritis Mother         hip replacement age 59    No Known Problems Brother     Colon Cancer Maternal Grandfather 60        lived until age 80    Diabetes Maternal Grandfather     Heart Disease Maternal Grandfather     Lung Disorder Paternal Grandfather         cancer    Heart Disease Paternal Grandmother     Asthma Paternal Grandmother     No Known Problems Brother     Ovarian Cancer Neg     DVT/VTE Neg      Social History:  Social History     Socioeconomic History    Marital status: Single   Occupational History    Occupation:       Comment: [employer]   Tobacco Use    Smoking status: Never    Smokeless tobacco: Never   Vaping Use    Vaping status: Never Used   Substance and Sexual Activity    Alcohol use: Yes    Drug use: No    Sexual activity: Yes     Partners: Male     Birth control/protection: Pill   Other Topics Concern    Blood Transfusions No   Social History Narrative    Live with dog    No h/o abuse     Social Determinants of Health      Received from [facility]    Social Connections    Received from [facility]    Housing Stability     Allergies/Medications:   Allergies:   Allergies   Allergen Reactions    Bacitracin HIVES    Neomycin HIVES    Polymyxin B HIVES    Zithromax [Azithromycin] UNKNOWN     Increases symptoms of strep    Benzalkonium Chloride RASH     (Not in a hospital admission)      Review of Systems:   Review of Systems    Physical Exam:   Vital Signs:  Blood pressure 130/76, height 67\", weight 174 lb 14.4 oz (79.3 kg), last menstrual period 09/08/2024, not currently breastfeeding.  Physical Exam  Cervical Papanicolaou {Cervical Pap:5397}    Results:     Lab Results   Component Value Date    WBC 6.1 10/13/2023    HGB 14.5 10/13/2023    HCT 43.9 10/13/2023    .0 10/13/2023    CREATSERUM 0.84 10/13/2023    BUN 12 10/13/2023     10/13/2023    K 4.6 10/13/2023     10/13/2023    CO2 26.0 10/13/2023    GLU 92 10/13/2023    CA 9.4 10/13/2023    ALB 3.7 10/13/2023    ALKPHO 44 10/13/2023    BILT 0.4 10/13/2023    TP 7.5 10/13/2023    AST 23 10/13/2023    ALT 33 10/13/2023    TSH 0.909 10/13/2023     No results found.        Assessment/Plan:     * No active hospital problems. *      [unfilled]      Natasha Akhtar MD  10/2/2024

## 2024-10-03 LAB — HPV E6+E7 MRNA CVX QL NAA+PROBE: NEGATIVE

## (undated) NOTE — MR AVS SNAPSHOT
After Visit Summary   8/19/2021    Daryn Morris    MRN: [id number]           Visit Information     Date & Time  8/19/2021  4:30 PM Provider  Shannon Akhtar MD [address] [facility] Dept. Friday for many common conditions such as allergies, colds, cough, fever, rash, sore throat, headache and pink eye.   The cost for a Video Visit is currently $35.         If you receive a survey from [employer], please take a few minutes to complete it and Providers  Conditions needing urgent attention, but are   non-life-threatening. Also available by appointment Average cost  $120*     EMERGENCY ROOM Life-threatening emergencies needing immediate intervention at a hospital emergency room.  Average cost

## (undated) NOTE — Clinical Note
04/24/2017  To: Oliver Darby    Re: Test Results      Thank you for your recent visit to our office. We have received your test results, which were done on   04/18/2017.      PAP Test:    Your results are normal.        HPV (Human papillomavirus) Test (r

## (undated) NOTE — MR AVS SNAPSHOT
After Visit Summary   4/18/2017    Duncan Jackson    MRN: [id number]           Visit Information        Provider Department Dept Phone    4/18/2017  5:30 PM Ileana Rand MD [facility]  [phone]      Your Vitals Were     BP Ht [employer] Activation Code: [email or web address]  Expires: 6/17/2017  5:35 PM      Enter your Zip Code and Date of Birth (mm/dd/yyyy) as indicated and click Next. You will be taken to the next sign-up page. Create a [employer] Username.  This will be your MyChart l

## (undated) NOTE — LETTER
Lieutenant Giordano, :1990    CONSENT FOR PROCEDURE/SEDATION    1. I authorize the performance upon Lieutenant Giordano  the following: Colposcopy with biopsy and Endocervical curettage    2.  I authorize Dr. Ingrid Brooks MD (and whomever Relationship to patient: ____________________________________________    Witness: _________________________________________ Date:___________     Physician Signature: _______________________________ Date:___________

## (undated) NOTE — MR AVS SNAPSHOT
After Visit Summary   6/23/2020    Missy Burrell    MRN: [id number]           Visit Information     Date & Time  6/23/2020  4:30 PM Provider  Melody Akhtar MD [address] [facility] Dept. including white bread, rice and pasta   Eat plenty of protein, keep the fat content low Sugars:  sodas and sports drinks, candies and desserts   Eat plenty of low-fat dairy products High fat meats and dairy   Choose whole grain products Foods high in sodiu Communicate with a [facility] Physician or [facility] online. The physician will respond and provide   a treatment plan within a few hours.  ONLINE VISIT  Primary Care Providers  Treatment for mild illness or injury that does not require immediate attention VIDEO VISITS

## (undated) NOTE — LETTER
Aiyana Storey, :1990    CONSENT FOR PROCEDURE/SEDATION    1. I authorize the performance upon Aiyana Storey  the following: Colposcopy with biopsy and Endocervical curettage    2.  I authorize Dr. Trevor Sandra MD (and whomever ____________________________________________    Witness: _________________________________________ Date:___________     Physician Signature: _______________________________ Date:___________